# Patient Record
Sex: MALE | Race: WHITE | NOT HISPANIC OR LATINO | Employment: OTHER | URBAN - NONMETROPOLITAN AREA
[De-identification: names, ages, dates, MRNs, and addresses within clinical notes are randomized per-mention and may not be internally consistent; named-entity substitution may affect disease eponyms.]

---

## 2017-01-01 ENCOUNTER — RESULTS ENCOUNTER (OUTPATIENT)
Dept: UROLOGY | Facility: CLINIC | Age: 67
End: 2017-01-01

## 2017-01-01 ENCOUNTER — OFFICE VISIT (OUTPATIENT)
Dept: UROLOGY | Facility: CLINIC | Age: 67
End: 2017-01-01

## 2017-01-01 VITALS
TEMPERATURE: 97.4 F | WEIGHT: 165 LBS | HEIGHT: 66 IN | SYSTOLIC BLOOD PRESSURE: 122 MMHG | DIASTOLIC BLOOD PRESSURE: 68 MMHG | BODY MASS INDEX: 26.52 KG/M2

## 2017-01-01 DIAGNOSIS — C61 PROSTATE CANCER (HCC): Primary | ICD-10-CM

## 2017-01-01 DIAGNOSIS — R33.9 RETENTION, URINE: ICD-10-CM

## 2017-01-01 DIAGNOSIS — C61 PROSTATE CANCER (HCC): ICD-10-CM

## 2017-01-01 PROCEDURE — 99214 OFFICE O/P EST MOD 30 MIN: CPT | Performed by: UROLOGY

## 2017-01-01 RX ORDER — AMOXICILLIN AND CLAVULANATE POTASSIUM 500; 125 MG/1; MG/1
1 TABLET, FILM COATED ORAL 2 TIMES DAILY
COMMUNITY
End: 2018-01-01

## 2017-01-12 ENCOUNTER — TELEPHONE (OUTPATIENT)
Dept: UROLOGY | Facility: CLINIC | Age: 67
End: 2017-01-12

## 2017-01-12 DIAGNOSIS — C61 PROSTATE CANCER (HCC): Primary | ICD-10-CM

## 2017-01-12 DIAGNOSIS — Z12.5 ENCOUNTER FOR SCREENING FOR MALIGNANT NEOPLASM OF PROSTATE: ICD-10-CM

## 2017-01-12 NOTE — TELEPHONE ENCOUNTER
I have talked to dr bae and he would like to see sarbjit with a psa before setting up the imaging.       ----- Message from Keren Lopez MA sent at 1/11/2017 11:21 AM CST -----  CHAVEZ DURAN FROM [facility] CALLED REGARDING A PROCEDURE DR. BAE WAS WANTING TO PERFORM. THE PATIENT AGREED HOWEVER CHAVEZ STATES HE MUST BE SEDATED AND WANTS TO SPEAK WITH DR. BAE  COULD YOU PLEASE ADVISE    [phone]  OR CELL [phone]

## 2017-01-17 ENCOUNTER — RESULTS ENCOUNTER (OUTPATIENT)
Dept: UROLOGY | Facility: CLINIC | Age: 67
End: 2017-01-17

## 2017-01-17 DIAGNOSIS — Z12.5 ENCOUNTER FOR SCREENING FOR MALIGNANT NEOPLASM OF PROSTATE: ICD-10-CM

## 2017-01-17 DIAGNOSIS — C61 PROSTATE CANCER (HCC): ICD-10-CM

## 2017-01-23 ENCOUNTER — TELEPHONE (OUTPATIENT)
Dept: UROLOGY | Facility: CLINIC | Age: 67
End: 2017-01-23

## 2017-01-23 NOTE — TELEPHONE ENCOUNTER
DONE      ----- Message from Karena Sainz sent at 1/23/2017  1:09 PM CST -----  Contact: [facility]  They said they need an order faxed today to draw his PSA for his apt on Thurs. The fax is [phone], the phone if you need to call is [phone].

## 2017-01-26 ENCOUNTER — OFFICE VISIT (OUTPATIENT)
Dept: UROLOGY | Facility: CLINIC | Age: 67
End: 2017-01-26

## 2017-01-26 VITALS
SYSTOLIC BLOOD PRESSURE: 124 MMHG | DIASTOLIC BLOOD PRESSURE: 68 MMHG | HEIGHT: 67 IN | BODY MASS INDEX: 25.43 KG/M2 | TEMPERATURE: 96.3 F | WEIGHT: 162 LBS

## 2017-01-26 DIAGNOSIS — C61 PROSTATE CANCER (HCC): Primary | ICD-10-CM

## 2017-01-26 DIAGNOSIS — N30.01 ACUTE CYSTITIS WITH HEMATURIA: ICD-10-CM

## 2017-01-26 DIAGNOSIS — R33.9 RETENTION, URINE: ICD-10-CM

## 2017-01-26 PROCEDURE — 99214 OFFICE O/P EST MOD 30 MIN: CPT | Performed by: UROLOGY

## 2017-01-26 RX ORDER — AMOXICILLIN AND CLAVULANATE POTASSIUM 875; 125 MG/1; MG/1
1 TABLET, FILM COATED ORAL 2 TIMES DAILY
COMMUNITY
End: 2017-01-26

## 2017-01-26 RX ORDER — SULFAMETHOXAZOLE AND TRIMETHOPRIM 800; 160 MG/1; MG/1
1 TABLET ORAL 2 TIMES DAILY
Qty: 14 TABLET | Refills: 0 | Status: SHIPPED | OUTPATIENT
Start: 2017-01-26 | End: 2017-02-02

## 2017-01-26 NOTE — PROGRESS NOTES
Subjective    Mr. Cano is 66 y.o. male    Chief Complaint: Prostate Cancer    History of Present Illness     Prostate Cancer  Pt. presents with history of prostate cancer diagnosed in April 2013. Current disease state ishigh risk disease Pt. underwent watchful waiting Path showing T1c 8 adenocarcinoma of prostate with N/A surgical margins. Patient has not received XRT completed in April 2013 .He is on nothing for management of prostate cancer. Pt. having zero ppd incontinence. Associated voiding symptoms include No evidence of voiding symptoms . Pt. Is having erectile dysfunction thatNA to PDE5 inhibitors. There has been no bone pain, weight loss, abdominal pain, back pain, or gross hematuria. Most recent PSA 45.38.     Urinary Retention  Patient complains of urinary retention. Onset of retention was 6 months ago and was gradual in onset. Patient currently does have a urinary catheter in place. none ml of urine were drained when catheter was placed. Prior to this event voiding symptoms consisted of none. Prior treatments include SPT in place. Recent medications that may have affected his voiding include none.     UTI  Patient is here for recurrent UTI.  The infections have been occurring for 1week.  Context of the infections initial.  Type of UTI is Acute cystitis Patient has had 1 infections in the last year.  Of the diagnosed infections, 1 have been culture proven.  Onset has been gradual. Course of the symptoms has been unchanged .  Associated symptoms include hematuria.  The patient has tried  antibiotics  somewhat effective for management.   Previous  urologic procedures SPT.  Previous workup includes urine culture.      The following portions of the patient's history were reviewed and updated as appropriate: allergies, current medications, past family history, past medical history, past social history, past surgical history and problem list.    Review of Systems   Constitutional: Negative for chills and fever.  "  Gastrointestinal: Negative for abdominal pain, anal bleeding and blood in stool.   Genitourinary: Negative for flank pain and hematuria.         Current Outpatient Prescriptions:   •  amantadine (SYMMETREL) 100 MG capsule, , Disp: , Rfl:   •  levothyroxine (SYNTHROID, LEVOTHROID) 112 MCG tablet, , Disp: , Rfl:   •  PARoxetine (PAXIL) 20 MG tablet, , Disp: , Rfl:   •  risperiDONE (RisperDAL) 0.5 MG tablet, , Disp: , Rfl:   •  tamsulosin (FLOMAX) 0.4 MG capsule 24 hr capsule, , Disp: , Rfl:   •  warfarin (COUMADIN) 2.5 MG tablet, 2 mg., Disp: , Rfl:   •  sulfamethoxazole-trimethoprim (BACTRIM DS) 800-160 MG per tablet, Take 1 tablet by mouth 2 (Two) Times a Day for 7 days., Disp: 14 tablet, Rfl: 0    Past Medical History   Diagnosis Date   • Anemia    • Blood clotting disorder    • Cancer      prostate   • Depression    • Mentally challenged    • Thyroid disease        No past surgical history on file.    Social History     Social History   • Marital status: Single     Spouse name: N/A   • Number of children: N/A   • Years of education: N/A     Social History Main Topics   • Smoking status: Never Smoker   • Smokeless tobacco: None   • Alcohol use None   • Drug use: None   • Sexual activity: Not Asked     Other Topics Concern   • None     Social History Narrative       Family History   Problem Relation Age of Onset   • No Known Problems Father    • No Known Problems Mother        Objective    Visit Vitals   • /68   • Temp 96.3 °F (35.7 °C)   • Ht 67\" (170.2 cm)   • Wt 162 lb (73.5 kg)   • BMI 25.37 kg/m2       Physical Exam   Constitutional: He is oriented to person, place, and time. He appears well-developed and well-nourished. No distress.   Pulmonary/Chest: Effort normal.   Abdominal: Soft. He exhibits no distension and no mass. There is no tenderness. There is no rebound and no guarding. No hernia.   Neurological: He is alert and oriented to person, place, and time.   Skin: Skin is warm and dry. He is not " diaphoretic.   Psychiatric: He has a normal mood and affect.   Vitals reviewed.          Results for orders placed or performed during the hospital encounter of 03/29/16   CBC (No diff)   Result Value Ref Range    WBC 7.52 4.80 - 10.80 K/mcL    RBC 4.32 (L) 4.80 - 5.90 M/mcL    Hemoglobin 13.1 (L) 14.0 - 18.0 g/dL    Hematocrit 38.0 (L) 40.0 - 52.0 %    MCV 88.0 82.0 - 95.0 fL    MCH 30.3 28.0 - 32.0 pg    MCHC 34.5 33.0 - 36.0 gm/dL    RDW-SD 44.8 40.0 - 54.0 fL    RDW-CV 13.8 12.0 - 15.0 %    RDW 13.8 12 - 15 %    Platelets 262 130 - 400 K/mcL   APTT   Result Value Ref Range    PTT 40.1 (H) 24.1 - 34.8 Seconds   Protime-INR   Result Value Ref Range    Protime 16.4 (H) 11.9 - 14.6 Seconds    INR 1.30 (H) 0.91 - 1.09   Basic metabolic panel   Result Value Ref Range    Sodium 143 135 - 145 mmol/L    Potassium 3.5 3.5 - 5.3 mmol/L    Chloride 96 (L) 98 - 110 mmol/L    CO2 36 (H) 24 - 31 mmol/L    Glucose 101 (H) 70 - 100 mg/dL    BUN 9 5 - 21 mg/dL    Creatinine 0.89 0.5 - 1.4 mg/dL    Calcium 9.2 8.4 - 10.4 mg/dL    Anion Gap 12 4 - 13 mmol/L    Est GFR by Clearance 85 ml/min/1.732   Urinalysis   Result Value Ref Range    Color Yellow     Appearance, UA Cloudy (A) Clear    pH, UA 5.5 5.0 - 8.0    Specific Gravity, UA 1.017 1.005 - 1.030    Glucose, UA Negative Negative mg/dL    Ketones, UA Negative Negative mg/dL    Bilirubin, UA Negative Negative    Blood, UA Small (A) Negative    Protein, UA Trace (A) Negative mg/dL    Nitrite, UA Negative Negative    Leukocytes, UA Large (A) Negative    Urobilinogen, UA 0.2 0.2,1.0 E.U./dL   Urinalysis, Microscopic only   Result Value Ref Range    WBC, UA Too numerous to count (A) None Seen /hpf    RBC, UA Too numerous to count (A) None Seen /hpf    Epithelial Cells, UA None Seen None Seen /hpf    Bacteria, UA 2+ (A) None Seen /hpf    Casts 3-6 /lpf   Type and screen   Result Value Ref Range    ABORH O Rh Negative     Antibody Screen Negative     HBB Previous History Records  Reviewed      Assessment and Plan    Tucker was seen today for prostate cancer.    Diagnoses and all orders for this visit:    Prostate cancer    Retention, urine    Acute cystitis with hematuria  -     sulfamethoxazole-trimethoprim (BACTRIM DS) 800-160 MG per tablet; Take 1 tablet by mouth 2 (Two) Times a Day for 7 days.    I had a long discussion with the patient's caregiver.  He has had rapid worsening of his PSA.  To me this indicates he has metastatic disease.    I reviewed 10 pages of outside medical records summarizes follows: He had a PSA of 45.38.  It was previously 26.9 in October.  He had a urine culture positive for Klebsiella.  This was pansensitive.  I am going to switch to Bactrim for UTI.     Patient is severely  Mentally challenged and can not tolerate imaging.  His PSA has nearly doubled in 3 months.  I am going to presume he has metastatic disease.  I am going to give him Firmagon for 3 months then switch him over to Lupron every 6 months.

## 2017-01-26 NOTE — LETTER
January 26, 2017     Franklin Lara MD  [address] [facility] [address]    Patient: Tucker Cano   YOB: 1950   Date of Visit: 1/26/2017       Dear Dr. Marco MD:    Thank you for referring Tucker Cano to me for evaluation. Below are the relevant portions of my assessment and plan of care.    If you have questions, please do not hesitate to call me. I look forward to following Tucker along with you.         Sincerely,        Sohail Schmitz MD        CC: No Recipients  Sohail Schmitz MD  1/26/2017  8:48 AM  Signed  Subjective    Mr. Cano is 66 y.o. male    Chief Complaint: Prostate Cancer    History of Present Illness     Prostate Cancer  Pt. presents with history of prostate cancer diagnosed in April 2013. Current disease state ishigh risk disease Pt. underwent watchful waiting Path showing T1c 8 adenocarcinoma of prostate with N/A surgical margins. Patient has not received XRT completed in April 2013 .He is on nothing for management of prostate cancer. Pt. having zero ppd incontinence. Associated voiding symptoms include No evidence of voiding symptoms . Pt. Is having erectile dysfunction thatNA to PDE5 inhibitors. There has been no bone pain, weight loss, abdominal pain, back pain, or gross hematuria. Most recent PSA 45.38.     Urinary Retention  Patient complains of urinary retention. Onset of retention was 6 months ago and was gradual in onset. Patient currently does have a urinary catheter in place. none ml of urine were drained when catheter was placed. Prior to this event voiding symptoms consisted of none. Prior treatments include SPT in place. Recent medications that may have affected his voiding include none.     UTI  Patient is here for recurrent UTI.  The infections have been occurring for 1week.  Context of the infections initial.  Type of UTI is Acute cystitis Patient has had 1 infections in the last year.  Of the diagnosed infections, 1 have been culture proven.  Onset  has been gradual. Course of the symptoms has been unchanged .  Associated symptoms include hematuria.  The patient has tried  antibiotics  somewhat effective for management.   Previous  urologic procedures SPT.  Previous workup includes urine culture.      The following portions of the patient's history were reviewed and updated as appropriate: allergies, current medications, past family history, past medical history, past social history, past surgical history and problem list.    Review of Systems   Constitutional: Negative for chills and fever.   Gastrointestinal: Negative for abdominal pain, anal bleeding and blood in stool.   Genitourinary: Negative for flank pain and hematuria.         Current Outpatient Prescriptions:   •  amantadine (SYMMETREL) 100 MG capsule, , Disp: , Rfl:   •  levothyroxine (SYNTHROID, LEVOTHROID) 112 MCG tablet, , Disp: , Rfl:   •  PARoxetine (PAXIL) 20 MG tablet, , Disp: , Rfl:   •  risperiDONE (RisperDAL) 0.5 MG tablet, , Disp: , Rfl:   •  tamsulosin (FLOMAX) 0.4 MG capsule 24 hr capsule, , Disp: , Rfl:   •  warfarin (COUMADIN) 2.5 MG tablet, 2 mg., Disp: , Rfl:   •  sulfamethoxazole-trimethoprim (BACTRIM DS) 800-160 MG per tablet, Take 1 tablet by mouth 2 (Two) Times a Day for 7 days., Disp: 14 tablet, Rfl: 0    Past Medical History   Diagnosis Date   • Anemia    • Blood clotting disorder    • Cancer      prostate   • Depression    • Mentally challenged    • Thyroid disease        No past surgical history on file.    Social History     Social History   • Marital status: Single     Spouse name: N/A   • Number of children: N/A   • Years of education: N/A     Social History Main Topics   • Smoking status: Never Smoker   • Smokeless tobacco: None   • Alcohol use None   • Drug use: None   • Sexual activity: Not Asked     Other Topics Concern   • None     Social History Narrative       Family History   Problem Relation Age of Onset   • No Known Problems Father    • No Known Problems Mother   "      Objective    Visit Vitals   • /68   • Temp 96.3 °F (35.7 °C)   • Ht 67\" (170.2 cm)   • Wt 162 lb (73.5 kg)   • BMI 25.37 kg/m2       Physical Exam   Constitutional: He is oriented to person, place, and time. He appears well-developed and well-nourished. No distress.   Pulmonary/Chest: Effort normal.   Abdominal: Soft. He exhibits no distension and no mass. There is no tenderness. There is no rebound and no guarding. No hernia.   Neurological: He is alert and oriented to person, place, and time.   Skin: Skin is warm and dry. He is not diaphoretic.   Psychiatric: He has a normal mood and affect.   Vitals reviewed.          Results for orders placed or performed during the hospital encounter of 03/29/16   CBC (No diff)   Result Value Ref Range    WBC 7.52 4.80 - 10.80 K/mcL    RBC 4.32 (L) 4.80 - 5.90 M/mcL    Hemoglobin 13.1 (L) 14.0 - 18.0 g/dL    Hematocrit 38.0 (L) 40.0 - 52.0 %    MCV 88.0 82.0 - 95.0 fL    MCH 30.3 28.0 - 32.0 pg    MCHC 34.5 33.0 - 36.0 gm/dL    RDW-SD 44.8 40.0 - 54.0 fL    RDW-CV 13.8 12.0 - 15.0 %    RDW 13.8 12 - 15 %    Platelets 262 130 - 400 K/mcL   APTT   Result Value Ref Range    PTT 40.1 (H) 24.1 - 34.8 Seconds   Protime-INR   Result Value Ref Range    Protime 16.4 (H) 11.9 - 14.6 Seconds    INR 1.30 (H) 0.91 - 1.09   Basic metabolic panel   Result Value Ref Range    Sodium 143 135 - 145 mmol/L    Potassium 3.5 3.5 - 5.3 mmol/L    Chloride 96 (L) 98 - 110 mmol/L    CO2 36 (H) 24 - 31 mmol/L    Glucose 101 (H) 70 - 100 mg/dL    BUN 9 5 - 21 mg/dL    Creatinine 0.89 0.5 - 1.4 mg/dL    Calcium 9.2 8.4 - 10.4 mg/dL    Anion Gap 12 4 - 13 mmol/L    Est GFR by Clearance 85 ml/min/1.732   Urinalysis   Result Value Ref Range    Color Yellow     Appearance, UA Cloudy (A) Clear    pH, UA 5.5 5.0 - 8.0    Specific Gravity, UA 1.017 1.005 - 1.030    Glucose, UA Negative Negative mg/dL    Ketones, UA Negative Negative mg/dL    Bilirubin, UA Negative Negative    Blood, UA Small (A) " Negative    Protein, UA Trace (A) Negative mg/dL    Nitrite, UA Negative Negative    Leukocytes, UA Large (A) Negative    Urobilinogen, UA 0.2 0.2,1.0 E.U./dL   Urinalysis, Microscopic only   Result Value Ref Range    WBC, UA Too numerous to count (A) None Seen /hpf    RBC, UA Too numerous to count (A) None Seen /hpf    Epithelial Cells, UA None Seen None Seen /hpf    Bacteria, UA 2+ (A) None Seen /hpf    Casts 3-6 /lpf   Type and screen   Result Value Ref Range    ABORH O Rh Negative     Antibody Screen Negative     HBB Previous History Records Reviewed      Assessment and Plan    Tucker was seen today for prostate cancer.    Diagnoses and all orders for this visit:    Prostate cancer    Retention, urine    Acute cystitis with hematuria  -     sulfamethoxazole-trimethoprim (BACTRIM DS) 800-160 MG per tablet; Take 1 tablet by mouth 2 (Two) Times a Day for 7 days.    I had a long discussion with the patient's caregiver.  He has had rapid worsening of his PSA.  To me this indicates he has metastatic disease.    I reviewed 10 pages of outside medical records summarizes follows: He had a PSA of 45.38.  It was previously 26.9 in October.  He had a urine culture positive for Klebsiella.  This was pansensitive.  I am going to switch to Bactrim for UTI.     Patient is severely  Mentally challenged and can not tolerate imaging.  His PSA has nearly doubled in 3 months.  I am going to presume he has metastatic disease.  I am going to give him Firmagon for 3 months then switch him over to Lupron every 6 months.

## 2017-01-26 NOTE — MR AVS SNAPSHOT
Tucker MELISSA Cano   1/26/2017 8:10 AM   Office Visit    Dept Phone:  [phone]   Encounter #:  [id number]    Provider:  Sohail Schmitz MD   Department:  [facility] UROLOGY                Your Full Care Plan              Today's Medication Changes          These changes are accurate as of: 1/26/17  8:31 AM.  If you have any questions, ask your nurse or doctor.               New Medication(s)Ordered:     sulfamethoxazole-trimethoprim 800-160 MG per tablet   Commonly known as:  BACTRIM DS   Take 1 tablet by mouth 2 (Two) Times a Day for 7 days.   Started by:  Sohail Schmitz MD         Medication(s)that have changed:     warfarin 2.5 MG tablet   Commonly known as:  COUMADIN   2 mg.   What changed:  Another medication with the same name was removed. Continue taking this medication, and follow the directions you see here.   Changed by:  Sohail Schmitz MD         Stop taking medication(s)listed here:     amoxicillin-clavulanate 500-125 MG per tablet   Commonly known as:  AUGMENTIN   Stopped by:  Sohail Schmitz MD           amoxicillin-clavulanate 875-125 MG per tablet   Commonly known as:  AUGMENTIN   Stopped by:  Sohail Schmitz MD                Where to Get Your Medications      These medications were sent to [place], [place] - [address] [facility] [phone]  - [phone]   [address] [facility] [address]     Phone:  [phone]     sulfamethoxazole-trimethoprim 800-160 MG per tablet                  Your Updated Medication List          This list is accurate as of: 1/26/17  8:31 AM.  Always use your most recent med list.                amantadine 100 MG capsule   Commonly known as:  SYMMETREL       levothyroxine 112 MCG tablet   Commonly known as:  SYNTHROID, LEVOTHROID       PARoxetine 20 MG tablet   Commonly known as:  PAXIL       risperiDONE 0.5 MG tablet   Commonly known as:  risperDAL       sulfamethoxazole-trimethoprim 800-160 MG per tablet   Commonly known as:  BACTRIM DS   Take 1 tablet by mouth 2 (Two) Times a Day for 7 days.       tamsulosin 0.4 MG capsule 24 hr capsule   Commonly known as:  FLOMAX       warfarin 2.5 MG tablet   Commonly known as:  COUMADIN               You Were Diagnosed With        Codes Comments    Prostate cancer    -  Primary ICD-10-CM: C61  ICD-9-CM: 185     Retention, urine     ICD-10-CM: R33.9  ICD-9-CM: 788.20     Acute cystitis with hematuria     ICD-10-CM: N30.01  ICD-9-CM: 595.0       Instructions     None    Patient Instructions History      Upcoming Appointments     Visit Type Date Time Department    FOLLOW UP 2017  8:10 AM [facility] UROLOGY [facility]AD    IN OFFICE PROCEDURE 2017  9:00 AM [facility] UROLOGY [facility]      MyChart Signup     [facility] [employer] allows you to send messages to your doctor, view your test results, renew your prescriptions, schedule appointments, and more. To sign up, go to [employer] and click on the Sign Up Now link in the New User? box. Enter your [employer] Activation Code exactly as it appears below along with the last four digits of your Social Security Number and your Date of Birth () to complete the sign-up process. If you do not sign up before the expiration date, you must request a new code.    [employer] Activation Code: [email or web address]  Expires: 2017  8:29 AM    If you have questions, you can email [email or web address] or call [phone] to talk to our [employer] staff. Remember, [employer] is NOT to be used for urgent needs. For medical emergencies, dial 911.               Other Info from Your Visit           Your Appointments     2017  9:00 AM CDT   IN OFFICE PROCEDURE with [facility] UROLOGY NURSE   [facility] UROLOG[facility] (--)    [address] [place] [address] [phone]           Bring medication list, test results, and radiology films that apply.              Allergies     Asa  "[Aspirin]      Cephalosporins        Reason for Visit     Prostate Cancer           Vital Signs     Blood Pressure Temperature Height Weight Body Mass Index Smoking Status    124/68 96.3 °F (35.7 °C) 67\" (170.2 cm) 162 lb (73.5 kg) 25.37 kg/m2 Never Smoker      Problems and Diagnoses Noted     Prostate cancer    -  Primary    Retention, urine        Infection or inflammation of bladder            "

## 2017-02-01 PROBLEM — C61 PROSTATE CANCER (HCC): Status: ACTIVE | Noted: 2017-02-01

## 2017-02-02 ENCOUNTER — PROCEDURE VISIT (OUTPATIENT)
Dept: UROLOGY | Facility: CLINIC | Age: 67
End: 2017-02-02

## 2017-02-02 DIAGNOSIS — C61 PROSTATE CANCER (HCC): Primary | ICD-10-CM

## 2017-02-02 PROCEDURE — 96402 CHEMO HORMON ANTINEOPL SQ/IM: CPT | Performed by: UROLOGY

## 2017-02-02 NOTE — PROGRESS NOTES
Patient of Dr. Nadir NEVES states he is here today for a 1 month firmagon injection. Patient denies any fever, chills, N&V or hematuria. I administered an 240mg Firmagon injection in the LUQ & RUQ with no complications. Dr. Johnson was here in the office at the time of injection. The patient was advised to schedule next injection in 1 month. Patient verbalized understanding.

## 2017-03-02 ENCOUNTER — PROCEDURE VISIT (OUTPATIENT)
Dept: UROLOGY | Facility: CLINIC | Age: 67
End: 2017-03-02

## 2017-03-02 DIAGNOSIS — C61 PROSTATE CANCER (HCC): Primary | ICD-10-CM

## 2017-03-02 PROCEDURE — 96402 CHEMO HORMON ANTINEOPL SQ/IM: CPT | Performed by: UROLOGY

## 2017-03-02 NOTE — PROGRESS NOTES
Patient of Dr. Schmitz states he is here today for a 1 month firmagon injection. Patient denies any fever, chills, N&V or hematuria. I administered an 80mg Firmagon injection in the LUQ with no complications. Dr. Johnson was here in the office at the time of injection. The patient was advised to schedule next injection in 1 month. Patient verbalized understanding.         Schedule next Firmagon after 3-30-17

## 2017-04-03 ENCOUNTER — PROCEDURE VISIT (OUTPATIENT)
Dept: UROLOGY | Facility: CLINIC | Age: 67
End: 2017-04-03

## 2017-04-03 DIAGNOSIS — C61 PROSTATE CANCER (HCC): Primary | ICD-10-CM

## 2017-04-03 PROCEDURE — 96402 CHEMO HORMON ANTINEOPL SQ/IM: CPT | Performed by: UROLOGY

## 2017-04-03 NOTE — PATIENT INSTRUCTIONS
Schedule next injection after May 1, 2017 at which time the patient will start 6 month/ 45 mg Lupron injections.

## 2017-04-03 NOTE — PROGRESS NOTES
Patient of Dr. Schmitz states he is here today for a 1 month firmagon injection. Patient denies any fever, chills, N&V or hematuria. I administered an 80mg Firmagon injection in the RUQ  with no complications. Dr. Schmitz was here in the office at the time of injection. The patient was advised to schedule next injection in 1 month. Patient verbalized understanding.       Schedule next injection after May 1, 2017 at which time the patient will start 6 month/ 45 mg Lupron injections.

## 2017-04-17 ENCOUNTER — RESULTS ENCOUNTER (OUTPATIENT)
Dept: UROLOGY | Facility: CLINIC | Age: 67
End: 2017-04-17

## 2017-04-17 DIAGNOSIS — C61 CANCER OF PROSTATE (HCC): ICD-10-CM

## 2017-04-26 ENCOUNTER — RESULTS ENCOUNTER (OUTPATIENT)
Dept: UROLOGY | Facility: CLINIC | Age: 67
End: 2017-04-26

## 2017-04-26 DIAGNOSIS — C61 PROSTATE CANCER (HCC): ICD-10-CM

## 2017-05-01 ENCOUNTER — OFFICE VISIT (OUTPATIENT)
Dept: UROLOGY | Facility: CLINIC | Age: 67
End: 2017-05-01

## 2017-05-01 VITALS
DIASTOLIC BLOOD PRESSURE: 90 MMHG | HEIGHT: 67 IN | TEMPERATURE: 97.3 F | SYSTOLIC BLOOD PRESSURE: 150 MMHG | WEIGHT: 167 LBS | BODY MASS INDEX: 26.21 KG/M2

## 2017-05-01 DIAGNOSIS — R33.9 RETENTION, URINE: ICD-10-CM

## 2017-05-01 DIAGNOSIS — C61 PROSTATE CANCER (HCC): Primary | ICD-10-CM

## 2017-05-01 PROCEDURE — 99213 OFFICE O/P EST LOW 20 MIN: CPT | Performed by: UROLOGY

## 2017-05-02 ENCOUNTER — PROCEDURE VISIT (OUTPATIENT)
Dept: UROLOGY | Facility: CLINIC | Age: 67
End: 2017-05-02

## 2017-05-02 DIAGNOSIS — C61 PROSTATE CANCER (HCC): Primary | ICD-10-CM

## 2017-05-02 PROCEDURE — 96402 CHEMO HORMON ANTINEOPL SQ/IM: CPT | Performed by: UROLOGY

## 2017-07-19 ENCOUNTER — TELEPHONE (OUTPATIENT)
Dept: UROLOGY | Facility: CLINIC | Age: 67
End: 2017-07-19

## 2017-07-30 ENCOUNTER — RESULTS ENCOUNTER (OUTPATIENT)
Dept: UROLOGY | Facility: CLINIC | Age: 67
End: 2017-07-30

## 2017-07-30 DIAGNOSIS — C61 PROSTATE CANCER (HCC): ICD-10-CM

## 2017-08-01 ENCOUNTER — RESULTS ENCOUNTER (OUTPATIENT)
Dept: UROLOGY | Facility: CLINIC | Age: 67
End: 2017-08-01

## 2017-08-01 DIAGNOSIS — C61 PROSTATE CANCER (HCC): ICD-10-CM

## 2017-08-02 ENCOUNTER — OFFICE VISIT (OUTPATIENT)
Dept: UROLOGY | Facility: CLINIC | Age: 67
End: 2017-08-02

## 2017-08-02 VITALS
HEIGHT: 67 IN | DIASTOLIC BLOOD PRESSURE: 88 MMHG | BODY MASS INDEX: 26.06 KG/M2 | WEIGHT: 166 LBS | TEMPERATURE: 96.7 F | SYSTOLIC BLOOD PRESSURE: 134 MMHG

## 2017-08-02 DIAGNOSIS — C61 PROSTATE CANCER (HCC): Primary | ICD-10-CM

## 2017-08-02 DIAGNOSIS — R33.9 RETENTION, URINE: ICD-10-CM

## 2017-08-02 PROCEDURE — 99213 OFFICE O/P EST LOW 20 MIN: CPT | Performed by: UROLOGY

## 2017-08-02 NOTE — PROGRESS NOTES
Subjective    Mr. Cano is 67 y.o. male    Chief Complaint: Prostate Cancer    History of Present Illness       Prostate Cancer  Pt. presents with history of prostate cancer diagnosed in April 2013. Current disease state ishigh risk disease Pt. underwent watchful waiting Path showing T1c 8 adenocarcinoma of prostate with N/A surgical margins. Patient has not received XRT completed in April 2013 .He is on Lupron for management of prostate cancer, ADT started 1/17. Pt. having zero ppd incontinence. Associated voiding symptoms include No evidence of voiding symptoms . Pt. Is having erectile dysfunction thatNA to PDE5 inhibitors. There has been no bone pain, weight loss, abdominal pain, back pain, or gross hematuria. Most recent PSA 4.02 (8.14 ,45.18).      Urinary Retention  Patient complains of urinary retention. Onset of retention was 6 months ago and was gradual in onset. Patient currently does have a urinary catheter in place. none ml of urine were drained when catheter was placed. Prior to this event voiding symptoms consisted of none. Prior treatments include SPT in place. Recent medications that may have affected his voiding include none.     The following portions of the patient's history were reviewed and updated as appropriate: allergies, current medications, past family history, past medical history, past social history, past surgical history and problem list.    Review of Systems   Constitutional: Negative for chills and fever.   Gastrointestinal: Negative for abdominal pain, anal bleeding and blood in stool.   Genitourinary: Negative for flank pain and hematuria.         Current Outpatient Prescriptions:   •  amantadine (SYMMETREL) 100 MG capsule, Take 100 mg by mouth Daily., Disp: , Rfl:   •  levothyroxine (SYNTHROID, LEVOTHROID) 112 MCG tablet, Take 112 mcg by mouth Daily., Disp: , Rfl:   •  PARoxetine (PAXIL) 20 MG tablet, Take 20 mg by mouth Every Morning., Disp: , Rfl:   •  risperiDONE (RisperDAL) 0.5  "MG tablet, Take 0.5 mg by mouth Daily., Disp: , Rfl:   •  tamsulosin (FLOMAX) 0.4 MG capsule 24 hr capsule, 1 capsule Daily., Disp: , Rfl:   •  warfarin (COUMADIN) 2.5 MG tablet, 2 mg Daily. As directed, Disp: , Rfl:     Past Medical History:   Diagnosis Date   • Anemia    • Blood clotting disorder    • Cancer     prostate   • Depression    • Mentally challenged    • Thyroid disease        No past surgical history on file.    Social History     Social History   • Marital status: Single     Spouse name: N/A   • Number of children: N/A   • Years of education: N/A     Social History Main Topics   • Smoking status: Never Smoker   • Smokeless tobacco: None   • Alcohol use No   • Drug use: None   • Sexual activity: Not Asked     Other Topics Concern   • None     Social History Narrative       Family History   Problem Relation Age of Onset   • No Known Problems Father    • No Known Problems Mother        Objective    /88  Temp 96.7 °F (35.9 °C)  Ht 67\" (170.2 cm)  Wt 166 lb (75.3 kg)  BMI 26 kg/m2    Physical Exam   Constitutional: He is oriented to person, place, and time. He appears well-developed and well-nourished. No distress.   Pulmonary/Chest: Effort normal.   Abdominal: Soft. He exhibits no distension and no mass. There is no tenderness. There is no rebound and no guarding. No hernia.   Neurological: He is alert and oriented to person, place, and time.   Skin: Skin is warm and dry. He is not diaphoretic.   Psychiatric: He has a normal mood and affect.   Vitals reviewed.          Results for orders placed or performed during the hospital encounter of 03/29/16   CBC (No diff)   Result Value Ref Range    WBC 7.52 4.80 - 10.80 K/mcL    RBC 4.32 (L) 4.80 - 5.90 M/mcL    Hemoglobin 13.1 (L) 14.0 - 18.0 g/dL    Hematocrit 38.0 (L) 40.0 - 52.0 %    MCV 88.0 82.0 - 95.0 fL    MCH 30.3 28.0 - 32.0 pg    MCHC 34.5 33.0 - 36.0 gm/dL    RDW-SD 44.8 40.0 - 54.0 fL    RDW-CV 13.8 12.0 - 15.0 %    RDW 13.8 12 - 15 %    " Platelets 262 130 - 400 K/mcL   APTT   Result Value Ref Range    PTT 40.1 (H) 24.1 - 34.8 Seconds   Protime-INR   Result Value Ref Range    Protime 16.4 (H) 11.9 - 14.6 Seconds    INR 1.30 (H) 0.91 - 1.09   Basic metabolic panel   Result Value Ref Range    Sodium 143 135 - 145 mmol/L    Potassium 3.5 3.5 - 5.3 mmol/L    Chloride 96 (L) 98 - 110 mmol/L    CO2 36 (H) 24 - 31 mmol/L    Glucose 101 (H) 70 - 100 mg/dL    BUN 9 5 - 21 mg/dL    Creatinine 0.89 0.5 - 1.4 mg/dL    Calcium 9.2 8.4 - 10.4 mg/dL    Anion Gap 12 4 - 13 mmol/L    Est GFR by Clearance 85 ml/min/1.732   Urinalysis   Result Value Ref Range    Color Yellow     Appearance, UA Cloudy (A) Clear    pH, UA 5.5 5.0 - 8.0    Specific Gravity, UA 1.017 1.005 - 1.030    Glucose, UA Negative Negative mg/dL    Ketones, UA Negative Negative mg/dL    Bilirubin, UA Negative Negative    Blood, UA Small (A) Negative    Protein, UA Trace (A) Negative mg/dL    Nitrite, UA Negative Negative    Leukocytes, UA Large (A) Negative    Urobilinogen, UA 0.2 0.2,1.0 E.U./dL   Urinalysis, Microscopic only   Result Value Ref Range    WBC, UA Too numerous to count (A) None Seen /hpf    RBC, UA Too numerous to count (A) None Seen /hpf    Epithelial Cells, UA None Seen None Seen /hpf    Bacteria, UA 2+ (A) None Seen /hpf    Casts 3-6 /lpf   Type and screen   Result Value Ref Range    ABORH O Rh Negative     Antibody Screen Negative     HBB Previous History Records Reviewed      Assessment and Plan    Diagnoses and all orders for this visit:    Prostate cancer  -     PSA; Future  -     Testosterone; Future    Retention, urine          His PSA is decreased from 8 to 4 upon institution of ADT it was 45. Cont. Lupron.  Flush SPT daily.

## 2017-10-23 ENCOUNTER — PROCEDURE VISIT (OUTPATIENT)
Dept: UROLOGY | Facility: CLINIC | Age: 67
End: 2017-10-23

## 2017-10-23 DIAGNOSIS — C61 PROSTATE CANCER (HCC): Primary | ICD-10-CM

## 2017-10-23 NOTE — PROGRESS NOTES
Patient of Dr. Schmitz states he is here for his 6 month Lupron injection. Patient denies any fever, chills, N&V or hematuria. I administered 45 mg/6 month Lupron injection IM left hip with no complications. Dr. Schmitz was in the office today at the time of injection. The patient was advised to follow up in 6 months for his next Lupron injection. Patient verbalized understanding.

## 2017-12-04 NOTE — PROGRESS NOTES
Subjective    Mr. Cano is 67 y.o. male    Chief Complaint: Prostate Cancer    History of Present Illness     Prostate Cancer  Pt. presents with history of prostate cancer diagnosed in April 2013. Current disease state ishigh risk disease Pt. underwent watchful waiting Path showing T1c 8 adenocarcinoma of prostate with N/A surgical margins. Patient has not received XRT completed in April 2013 .He is on Lupron for management of prostate cancer, ADT started 1/17. Pt. having zero ppd incontinence. Associated voiding symptoms include No evidence of voiding symptoms . Pt. Is having erectile dysfunction thatNA to PDE5 inhibitors. There has been no bone pain, weight loss, abdominal pain, back pain, or gross hematuria. Most recent PSA 3.76(4.02, 8.14 ,45.18).      Urinary Retention  Patient complains of urinary retention. Onset of retention was 6 months ago and was gradual in onset. Patient currently does have a urinary catheter in place. none ml of urine were drained when catheter was placed. Prior to this event voiding symptoms consisted of none. Prior treatments include SPT in place. Recent medications that may have affected his voiding include none.    The following portions of the patient's history were reviewed and updated as appropriate: allergies, current medications, past family history, past medical history, past social history, past surgical history and problem list.    Review of Systems   Constitutional: Negative for chills and fever.   Gastrointestinal: Negative for abdominal pain, anal bleeding and blood in stool.   Genitourinary: Negative for flank pain, frequency, hematuria and urgency.         Current Outpatient Prescriptions:   •  amantadine (SYMMETREL) 100 MG capsule, Take 100 mg by mouth Daily., Disp: , Rfl:   •  amoxicillin-clavulanate (AUGMENTIN) 500-125 MG per tablet, Take 1 tablet by mouth 2 (Two) Times a Day., Disp: , Rfl:   •  levothyroxine (SYNTHROID, LEVOTHROID) 112 MCG tablet, Take 112 mcg by  "mouth Daily., Disp: , Rfl:   •  PARoxetine (PAXIL) 20 MG tablet, Take 20 mg by mouth Every Morning., Disp: , Rfl:   •  risperiDONE (RisperDAL) 0.5 MG tablet, Take 0.5 mg by mouth Daily., Disp: , Rfl:   •  tamsulosin (FLOMAX) 0.4 MG capsule 24 hr capsule, 1 capsule Daily., Disp: , Rfl:   •  warfarin (COUMADIN) 2.5 MG tablet, 2 mg Daily. As directed, Disp: , Rfl:     Past Medical History:   Diagnosis Date   • Anemia    • Blood clotting disorder    • Cancer     prostate   • Depression    • Mentally challenged    • Thyroid disease        History reviewed. No pertinent surgical history.    Social History     Social History   • Marital status: Single     Spouse name: N/A   • Number of children: N/A   • Years of education: N/A     Social History Main Topics   • Smoking status: Never Smoker   • Smokeless tobacco: Never Used   • Alcohol use No   • Drug use: None   • Sexual activity: Not Asked     Other Topics Concern   • None     Social History Narrative       Family History   Problem Relation Age of Onset   • No Known Problems Father    • No Known Problems Mother        Objective    /68  Temp 97.4 °F (36.3 °C)  Ht 66\" (167.6 cm)  Wt 165 lb (74.8 kg)  BMI 26.63 kg/m2    Physical Exam   Constitutional: He is oriented to person, place, and time. He appears well-developed and well-nourished. No distress.   Pulmonary/Chest: Effort normal.   Abdominal: Soft. He exhibits no distension and no mass. There is no tenderness. There is no rebound and no guarding. No hernia.   Neurological: He is alert and oriented to person, place, and time.   Skin: Skin is warm and dry. He is not diaphoretic.   Psychiatric: He has a normal mood and affect.   Vitals reviewed.          Results for orders placed or performed during the hospital encounter of 03/29/16   CBC (No diff)   Result Value Ref Range    WBC 7.52 4.80 - 10.80 K/mcL    RBC 4.32 (L) 4.80 - 5.90 M/mcL    Hemoglobin 13.1 (L) 14.0 - 18.0 g/dL    Hematocrit 38.0 (L) 40.0 - 52.0 % "    MCV 88.0 82.0 - 95.0 fL    MCH 30.3 28.0 - 32.0 pg    MCHC 34.5 33.0 - 36.0 gm/dL    RDW-SD 44.8 40.0 - 54.0 fL    RDW-CV 13.8 12.0 - 15.0 %    RDW 13.8 12 - 15 %    Platelets 262 130 - 400 K/mcL   APTT   Result Value Ref Range    PTT 40.1 (H) 24.1 - 34.8 Seconds   Protime-INR   Result Value Ref Range    Protime 16.4 (H) 11.9 - 14.6 Seconds    INR 1.30 (H) 0.91 - 1.09   Basic metabolic panel   Result Value Ref Range    Sodium 143 135 - 145 mmol/L    Potassium 3.5 3.5 - 5.3 mmol/L    Chloride 96 (L) 98 - 110 mmol/L    CO2 36 (H) 24 - 31 mmol/L    Glucose 101 (H) 70 - 100 mg/dL    BUN 9 5 - 21 mg/dL    Creatinine 0.89 0.5 - 1.4 mg/dL    Calcium 9.2 8.4 - 10.4 mg/dL    Anion Gap 12 4 - 13 mmol/L    Est GFR by Clearance 85 ml/min/1.732   Urinalysis   Result Value Ref Range    Color Yellow     Appearance, UA Cloudy (A) Clear    pH, UA 5.5 5.0 - 8.0    Specific Gravity, UA 1.017 1.005 - 1.030    Glucose, UA Negative Negative mg/dL    Ketones, UA Negative Negative mg/dL    Bilirubin, UA Negative Negative    Blood, UA Small (A) Negative    Protein, UA Trace (A) Negative mg/dL    Nitrite, UA Negative Negative    Leukocytes, UA Large (A) Negative    Urobilinogen, UA 0.2 0.2,1.0 E.U./dL   Urinalysis, Microscopic only   Result Value Ref Range    WBC, UA Too numerous to count (A) None Seen /hpf    RBC, UA Too numerous to count (A) None Seen /hpf    Epithelial Cells, UA None Seen None Seen /hpf    Bacteria, UA 2+ (A) None Seen /hpf    Casts 3-6 /lpf   Type and screen   Result Value Ref Range    ABORH O Rh Negative     Antibody Screen Negative     HBB Previous History Records Reviewed      Assessment and Plan    Tucker was seen today for prostate cancer.    Diagnoses and all orders for this visit:    Prostate cancer  -     PSA; Future  -     Testosterone; Future    Retention, urine    Other orders  -     SCANNED - LABS  -     SCANNED - LABS          I reviewed laboratories from [facility] indicating a PSA of 3.76.   Previously it had been 4.  His testosterone is at castrate levels.  He will follow-up in 4 months with repeat PSA testing.  Again, I reinforced Calcium and Vitamin D supplementation.    DC flomax as patient has SPT.

## 2018-01-01 ENCOUNTER — CONSULT (OUTPATIENT)
Dept: ONCOLOGY | Facility: CLINIC | Age: 68
End: 2018-01-01

## 2018-01-01 ENCOUNTER — OFFICE VISIT (OUTPATIENT)
Dept: UROLOGY | Facility: CLINIC | Age: 68
End: 2018-01-01

## 2018-01-01 ENCOUNTER — PROCEDURE VISIT (OUTPATIENT)
Dept: UROLOGY | Facility: CLINIC | Age: 68
End: 2018-01-01

## 2018-01-01 ENCOUNTER — LAB (OUTPATIENT)
Dept: LAB | Facility: HOSPITAL | Age: 68
End: 2018-01-01

## 2018-01-01 ENCOUNTER — TELEPHONE (OUTPATIENT)
Dept: ONCOLOGY | Facility: CLINIC | Age: 68
End: 2018-01-01

## 2018-01-01 ENCOUNTER — OFFICE VISIT (OUTPATIENT)
Dept: ONCOLOGY | Facility: CLINIC | Age: 68
End: 2018-01-01

## 2018-01-01 ENCOUNTER — TELEPHONE (OUTPATIENT)
Dept: UROLOGY | Facility: CLINIC | Age: 68
End: 2018-01-01

## 2018-01-01 ENCOUNTER — RESULTS ENCOUNTER (OUTPATIENT)
Dept: UROLOGY | Facility: CLINIC | Age: 68
End: 2018-01-01

## 2018-01-01 VITALS
HEIGHT: 68 IN | BODY MASS INDEX: 26.72 KG/M2 | TEMPERATURE: 97.6 F | DIASTOLIC BLOOD PRESSURE: 70 MMHG | SYSTOLIC BLOOD PRESSURE: 130 MMHG | HEART RATE: 107 BPM | OXYGEN SATURATION: 96 % | WEIGHT: 176.3 LBS | RESPIRATION RATE: 18 BRPM

## 2018-01-01 VITALS — TEMPERATURE: 98.6 F | WEIGHT: 177 LBS | BODY MASS INDEX: 26.83 KG/M2 | HEIGHT: 68 IN

## 2018-01-01 VITALS
SYSTOLIC BLOOD PRESSURE: 128 MMHG | RESPIRATION RATE: 16 BRPM | HEART RATE: 68 BPM | OXYGEN SATURATION: 90 % | WEIGHT: 173 LBS | BODY MASS INDEX: 26.22 KG/M2 | HEIGHT: 68 IN | DIASTOLIC BLOOD PRESSURE: 78 MMHG | TEMPERATURE: 97 F

## 2018-01-01 VITALS — TEMPERATURE: 97.8 F | WEIGHT: 155 LBS | BODY MASS INDEX: 23.49 KG/M2 | HEIGHT: 68 IN

## 2018-01-01 VITALS — BODY MASS INDEX: 27 KG/M2 | TEMPERATURE: 97.3 F | HEIGHT: 66 IN | WEIGHT: 168 LBS

## 2018-01-01 DIAGNOSIS — C61 PROSTATE CANCER (HCC): Primary | ICD-10-CM

## 2018-01-01 DIAGNOSIS — R33.9 RETENTION, URINE: ICD-10-CM

## 2018-01-01 DIAGNOSIS — C61 PROSTATIC CANCER (HCC): Primary | ICD-10-CM

## 2018-01-01 DIAGNOSIS — C61 PC (PROSTATE CANCER) (HCC): Primary | ICD-10-CM

## 2018-01-01 DIAGNOSIS — C61 PROSTATE CA (HCC): Primary | ICD-10-CM

## 2018-01-01 DIAGNOSIS — C61 PROSTATE CANCER (HCC): ICD-10-CM

## 2018-01-01 DIAGNOSIS — N30.21 CHRONIC CYSTITIS WITH HEMATURIA: ICD-10-CM

## 2018-01-01 LAB
ALBUMIN SERPL-MCNC: 4.3 G/DL (ref 3.5–5)
ALBUMIN SERPL-MCNC: 4.4 G/DL (ref 3.5–5)
ALBUMIN/GLOB SERPL: 1.1 G/DL (ref 1.1–2.5)
ALBUMIN/GLOB SERPL: 1.3 G/DL (ref 1.1–2.5)
ALP SERPL-CCNC: 168 U/L (ref 24–120)
ALP SERPL-CCNC: 271 U/L (ref 24–120)
ALT SERPL W P-5'-P-CCNC: 33 U/L (ref 0–54)
ALT SERPL W P-5'-P-CCNC: 37 U/L (ref 0–54)
ANION GAP SERPL CALCULATED.3IONS-SCNC: 10 MMOL/L (ref 4–13)
ANION GAP SERPL CALCULATED.3IONS-SCNC: 10 MMOL/L (ref 4–13)
AST SERPL-CCNC: 40 U/L (ref 7–45)
AST SERPL-CCNC: 42 U/L (ref 7–45)
BASOPHILS # BLD AUTO: 0.07 10*3/MM3 (ref 0–0.2)
BASOPHILS # BLD AUTO: 0.07 10*3/MM3 (ref 0–0.2)
BASOPHILS NFR BLD AUTO: 0.6 % (ref 0–2)
BASOPHILS NFR BLD AUTO: 0.9 % (ref 0–2)
BILIRUB SERPL-MCNC: 0.4 MG/DL (ref 0.1–1)
BILIRUB SERPL-MCNC: 0.8 MG/DL (ref 0.1–1)
BUN BLD-MCNC: 12 MG/DL (ref 5–21)
BUN BLD-MCNC: 8 MG/DL (ref 5–21)
BUN/CREAT SERPL: 13 (ref 7–25)
BUN/CREAT SERPL: 8.4 (ref 7–25)
CALCIUM SPEC-SCNC: 9.8 MG/DL (ref 8.4–10.4)
CALCIUM SPEC-SCNC: 9.8 MG/DL (ref 8.4–10.4)
CHLORIDE SERPL-SCNC: 100 MMOL/L (ref 98–110)
CHLORIDE SERPL-SCNC: 103 MMOL/L (ref 98–110)
CO2 SERPL-SCNC: 31 MMOL/L (ref 24–31)
CO2 SERPL-SCNC: 32 MMOL/L (ref 24–31)
CREAT BLD-MCNC: 0.92 MG/DL (ref 0.5–1.4)
CREAT BLD-MCNC: 0.95 MG/DL (ref 0.5–1.4)
DEPRECATED RDW RBC AUTO: 42.8 FL (ref 40–54)
DEPRECATED RDW RBC AUTO: 50.8 FL (ref 40–54)
EOSINOPHIL # BLD AUTO: 0.02 10*3/MM3 (ref 0–0.7)
EOSINOPHIL # BLD AUTO: 0.24 10*3/MM3 (ref 0–0.7)
EOSINOPHIL NFR BLD AUTO: 0.2 % (ref 0–4)
EOSINOPHIL NFR BLD AUTO: 3.1 % (ref 0–4)
ERYTHROCYTE [DISTWIDTH] IN BLOOD BY AUTOMATED COUNT: 13.2 % (ref 12–15)
ERYTHROCYTE [DISTWIDTH] IN BLOOD BY AUTOMATED COUNT: 15.3 % (ref 12–15)
GFR SERPL CREATININE-BSD FRML MDRD: 78 ML/MIN/1.73
GFR SERPL CREATININE-BSD FRML MDRD: 81 ML/MIN/1.73
GLOBULIN UR ELPH-MCNC: 3.4 GM/DL
GLOBULIN UR ELPH-MCNC: 3.9 GM/DL
GLUCOSE BLD-MCNC: 107 MG/DL (ref 70–100)
GLUCOSE BLD-MCNC: 93 MG/DL (ref 70–100)
HCT VFR BLD AUTO: 40.4 % (ref 40–52)
HCT VFR BLD AUTO: 41.4 % (ref 40–52)
HGB BLD-MCNC: 13.4 G/DL (ref 14–18)
HGB BLD-MCNC: 14.2 G/DL (ref 14–18)
HOLD SPECIMEN: NORMAL
IMM GRANULOCYTES # BLD: 0.03 10*3/MM3 (ref 0–0.03)
IMM GRANULOCYTES # BLD: 0.08 10*3/MM3 (ref 0–0.03)
IMM GRANULOCYTES NFR BLD: 0.4 % (ref 0–5)
IMM GRANULOCYTES NFR BLD: 0.7 % (ref 0–5)
LYMPHOCYTES # BLD AUTO: 1.57 10*3/MM3 (ref 0.72–4.86)
LYMPHOCYTES # BLD AUTO: 2.73 10*3/MM3 (ref 0.72–4.86)
LYMPHOCYTES NFR BLD AUTO: 12.8 % (ref 15–45)
LYMPHOCYTES NFR BLD AUTO: 35.3 % (ref 15–45)
MCH RBC QN AUTO: 29.3 PG (ref 28–32)
MCH RBC QN AUTO: 31.1 PG (ref 28–32)
MCHC RBC AUTO-ENTMCNC: 33.2 G/DL (ref 33–36)
MCHC RBC AUTO-ENTMCNC: 34.3 G/DL (ref 33–36)
MCV RBC AUTO: 88.4 FL (ref 82–95)
MCV RBC AUTO: 90.8 FL (ref 82–95)
MONOCYTES # BLD AUTO: 0.56 10*3/MM3 (ref 0.19–1.3)
MONOCYTES # BLD AUTO: 0.65 10*3/MM3 (ref 0.19–1.3)
MONOCYTES NFR BLD AUTO: 5.3 % (ref 4–12)
MONOCYTES NFR BLD AUTO: 7.2 % (ref 4–12)
NEUTROPHILS # BLD AUTO: 4.1 10*3/MM3 (ref 1.87–8.4)
NEUTROPHILS # BLD AUTO: 9.9 10*3/MM3 (ref 1.87–8.4)
NEUTROPHILS NFR BLD AUTO: 53.1 % (ref 39–78)
NEUTROPHILS NFR BLD AUTO: 80.4 % (ref 39–78)
NRBC BLD MANUAL-RTO: 0 /100 WBC (ref 0–0)
NRBC BLD MANUAL-RTO: 0 /100 WBC (ref 0–0)
PLATELET # BLD AUTO: 264 10*3/MM3 (ref 130–400)
PLATELET # BLD AUTO: 277 10*3/MM3 (ref 130–400)
PMV BLD AUTO: 9.4 FL (ref 6–12)
PMV BLD AUTO: 9.7 FL (ref 6–12)
POTASSIUM BLD-SCNC: 4.2 MMOL/L (ref 3.5–5.3)
POTASSIUM BLD-SCNC: 4.3 MMOL/L (ref 3.5–5.3)
PROT SERPL-MCNC: 7.8 G/DL (ref 6.3–8.7)
PROT SERPL-MCNC: 8.2 G/DL (ref 6.3–8.7)
PSA SERPL-MCNC: 47.5 NG/ML (ref 0–4)
RBC # BLD AUTO: 4.56 10*6/MM3 (ref 4.8–5.9)
RBC # BLD AUTO: 4.57 10*6/MM3 (ref 4.8–5.9)
SODIUM BLD-SCNC: 142 MMOL/L (ref 135–145)
SODIUM BLD-SCNC: 144 MMOL/L (ref 135–145)
WBC NRBC COR # BLD: 12.29 10*3/MM3 (ref 4.8–10.8)
WBC NRBC COR # BLD: 7.73 10*3/MM3 (ref 4.8–10.8)

## 2018-01-01 PROCEDURE — 99214 OFFICE O/P EST MOD 30 MIN: CPT | Performed by: UROLOGY

## 2018-01-01 PROCEDURE — 85025 COMPLETE CBC W/AUTO DIFF WBC: CPT

## 2018-01-01 PROCEDURE — 36415 COLL VENOUS BLD VENIPUNCTURE: CPT

## 2018-01-01 PROCEDURE — 99214 OFFICE O/P EST MOD 30 MIN: CPT | Performed by: INTERNAL MEDICINE

## 2018-01-01 PROCEDURE — 96402 CHEMO HORMON ANTINEOPL SQ/IM: CPT | Performed by: UROLOGY

## 2018-01-01 PROCEDURE — 99205 OFFICE O/P NEW HI 60 MIN: CPT | Performed by: INTERNAL MEDICINE

## 2018-01-01 PROCEDURE — 80053 COMPREHEN METABOLIC PANEL: CPT

## 2018-01-01 PROCEDURE — 80053 COMPREHEN METABOLIC PANEL: CPT | Performed by: INTERNAL MEDICINE

## 2018-01-01 PROCEDURE — 85025 COMPLETE CBC W/AUTO DIFF WBC: CPT | Performed by: INTERNAL MEDICINE

## 2018-01-01 PROCEDURE — 84153 ASSAY OF PSA TOTAL: CPT | Performed by: INTERNAL MEDICINE

## 2018-01-01 RX ORDER — ABIRATERONE ACETATE 250 MG/1
1000 TABLET ORAL DAILY
Qty: 120 TABLET | Refills: 11 | Status: SHIPPED | OUTPATIENT
Start: 2018-01-01

## 2018-01-01 RX ORDER — WARFARIN SODIUM 3 MG/1
3 TABLET ORAL
COMMUNITY

## 2018-01-01 RX ORDER — ASCORBIC ACID 500 MG
500 TABLET ORAL DAILY
COMMUNITY

## 2018-01-01 RX ORDER — PREDNISONE 1 MG/1
5 TABLET ORAL 2 TIMES DAILY
Qty: 60 TABLET | Refills: 11 | Status: SHIPPED | OUTPATIENT
Start: 2018-01-01

## 2018-01-01 RX ORDER — ACETAMINOPHEN 325 MG/1
650 TABLET ORAL EVERY 6 HOURS PRN
COMMUNITY

## 2018-01-01 RX ORDER — CALCIUM CARBONATE 500(1250)
TABLET ORAL 2 TIMES DAILY
COMMUNITY

## 2018-01-01 RX ORDER — AMOXICILLIN 250 MG
1 CAPSULE ORAL DAILY
COMMUNITY

## 2018-01-01 RX ORDER — PREDNISONE 1 MG/1
5 TABLET ORAL
Qty: 60 TABLET | Refills: 11 | Status: SHIPPED | OUTPATIENT
Start: 2018-01-01 | End: 2018-01-01 | Stop reason: SDUPTHER

## 2018-01-01 RX ORDER — MULTIPLE VITAMINS W/ MINERALS TAB 9MG-400MCG
1 TAB ORAL DAILY
COMMUNITY

## 2018-01-01 RX ORDER — WARFARIN SODIUM 2 MG/1
2 TABLET ORAL
COMMUNITY

## 2018-01-01 RX ORDER — SPIRONOLACTONE 25 MG/1
25 TABLET ORAL DAILY
COMMUNITY

## 2018-04-09 NOTE — PROGRESS NOTES
Patient of Dr. Schmitz states he is here for his 6 month Lupron injection. Patient denies any fever, chills, N&V or hematuria. I administered 45 mg/6 month Lupron injection IM right hip with no complications. Dr. Schmitz was in the office today at the time of injection. The patient was advised to follow up in 6 months for his next Lupron injection. Patient verbalized understanding.

## 2018-05-29 NOTE — PROGRESS NOTES
Subjective    Mr. Cano is 68 y.o. male     Chief Complaint: Prostate Cancer    History of Present Illness    Prostate Cancer  Pt. presents with history of prostate cancer diagnosed in April 2013. Current disease state ishigh risk disease Pt. underwent watchful waiting Path showing T1c 8 adenocarcinoma of prostate with N/A surgical margins. Patient has not received XRT completed in April 2013 .He is on Lupron for management of prostate cancer, ADT started 1/17. Pt. having zero ppd incontinence. Associated voiding symptoms include No evidence of voiding symptoms . Pt. Is having erectile dysfunction thatNA to PDE5 inhibitors. There has been no bone pain, weight loss, abdominal pain, back pain, or gross hematuria. Previous PSA 11.33. Most recent PSA on 05/30/2018 16.33.      Urinary Retention  Patient complains of urinary retention. Onset of retention was 36 months ago and was gradual in onset. Patient currently does have a urinary catheter in place. none ml of urine were drained when catheter was placed. Prior to this event voiding symptoms consisted of none. Prior treatments include SPT in place. Recent medications that may have affected his voiding include none.    The following portions of the patient's history were reviewed and updated as appropriate: allergies, current medications, past family history, past medical history, past social history, past surgical history and problem list.    Review of Systems   Constitutional: Negative for chills and fever.   Gastrointestinal: Negative for abdominal pain, anal bleeding and blood in stool.   Genitourinary: Negative for flank pain, frequency, hematuria and urgency.         Current Outpatient Prescriptions:   •  amantadine (SYMMETREL) 100 MG capsule, Take 100 mg by mouth Daily., Disp: , Rfl:   •  levothyroxine (SYNTHROID, LEVOTHROID) 112 MCG tablet, Take 112 mcg by mouth Daily., Disp: , Rfl:   •  PARoxetine (PAXIL) 20 MG tablet, Take 20 mg by mouth Every Morning., Disp: ,  "Rfl:   •  risperiDONE (RisperDAL) 0.5 MG tablet, Take 0.5 mg by mouth Daily., Disp: , Rfl:   •  warfarin (COUMADIN) 2.5 MG tablet, 2 mg Daily. As directed, Disp: , Rfl:   •  amoxicillin-clavulanate (AUGMENTIN) 500-125 MG per tablet, Take 1 tablet by mouth 2 (Two) Times a Day., Disp: , Rfl:   •  Calcium Carb-Cholecalciferol (OS-CARLOTA CALCIUM + D3) 500-200 MG-UNIT tablet, Take 1 tablet/day by mouth Daily., Disp: 90 tablet, Rfl: 3    Past Medical History:   Diagnosis Date   • Anemia    • Blood clotting disorder    • Cancer     prostate   • Depression    • Mentally challenged    • Thyroid disease        No past surgical history on file.    Social History     Social History   • Marital status: Single     Social History Main Topics   • Smoking status: Never Smoker   • Smokeless tobacco: Never Used   • Alcohol use No   • Drug use: Unknown     Other Topics Concern   • Not on file       Family History   Problem Relation Age of Onset   • No Known Problems Father    • No Known Problems Mother        Objective    Temp 97.8 °F (36.6 °C)   Ht 172.7 cm (68\")   Wt 70.3 kg (155 lb)   BMI 23.57 kg/m²     Physical Exam   Constitutional: He is oriented to person, place, and time. He appears well-developed and well-nourished. No distress.   Pulmonary/Chest: Effort normal.   Abdominal: Soft. He exhibits no distension and no mass. There is no tenderness. There is no rebound and no guarding. No hernia.   Neurological: He is alert and oriented to person, place, and time.   Skin: Skin is warm and dry. He is not diaphoretic.   Psychiatric: He has a normal mood and affect.   Vitals reviewed.          Results for orders placed or performed during the hospital encounter of 03/29/16   CBC (No diff)   Result Value Ref Range    WBC 7.52 4.80 - 10.80 K/mcL    RBC 4.32 (L) 4.80 - 5.90 M/mcL    Hemoglobin 13.1 (L) 14.0 - 18.0 g/dL    Hematocrit 38.0 (L) 40.0 - 52.0 %    MCV 88.0 82.0 - 95.0 fL    MCH 30.3 28.0 - 32.0 pg    MCHC 34.5 33.0 - 36.0 gm/dL "    RDW-SD 44.8 40.0 - 54.0 fL    RDW-CV 13.8 12.0 - 15.0 %    RDW 13.8 12 - 15 %    Platelets 262 130 - 400 K/mcL   APTT   Result Value Ref Range    PTT 40.1 (H) 24.1 - 34.8 Seconds   Protime-INR   Result Value Ref Range    Protime 16.4 (H) 11.9 - 14.6 Seconds    INR 1.30 (H) 0.91 - 1.09   Basic metabolic panel   Result Value Ref Range    Sodium 143 135 - 145 mmol/L    Potassium 3.5 3.5 - 5.3 mmol/L    Chloride 96 (L) 98 - 110 mmol/L    CO2 36 (H) 24 - 31 mmol/L    Glucose 101 (H) 70 - 100 mg/dL    BUN 9 5 - 21 mg/dL    Creatinine 0.89 0.5 - 1.4 mg/dL    Calcium 9.2 8.4 - 10.4 mg/dL    Anion Gap 12 4 - 13 mmol/L    Est GFR by Clearance 85 ml/min/1.732   Urinalysis   Result Value Ref Range    Color Yellow     Appearance, UA Cloudy (A) Clear    pH, UA 5.5 5.0 - 8.0    Specific Gravity, UA 1.017 1.005 - 1.030    Glucose, UA Negative Negative mg/dL    Ketones, UA Negative Negative mg/dL    Bilirubin, UA Negative Negative    Blood, UA Small (A) Negative    Protein, UA Trace (A) Negative mg/dL    Nitrite, UA Negative Negative    Leukocytes, UA Large (A) Negative    Urobilinogen, UA 0.2 0.2,1.0 E.U./dL   Urinalysis, Microscopic only   Result Value Ref Range    WBC, UA Too numerous to count (A) None Seen /hpf    RBC, UA Too numerous to count (A) None Seen /hpf    Epithelial Cells, UA None Seen None Seen /hpf    Bacteria, UA 2+ (A) None Seen /hpf    Casts 3-6 /lpf   Type and screen   Result Value Ref Range    ABORH O Rh Negative     Antibody Screen Negative     HBB Previous History Records Reviewed      Assessment and Plan    Tucker was seen today for prostate cancer.    Diagnoses and all orders for this visit:    Prostate cancer  -     Ambulatory Referral to Oncology  -     PSA DIAGNOSTIC; Future    Retention, urine          I reviewed laboratories from [facility] indicating a PSA of 16.33.  Previously it had been 11.33.  His testosterone is at castrate levels.  Obviously this is concerning for worsening of his  disease. His last Lupron injection appears to be in April 2018.     I discussed options including referral to oncology.  I discussed the new prostate cancer drug Erleada which would be indicated situation.  On  unfortunately I cannot get a CAT scan or a bone scan on him because he cannot stay still for the studies.    They would like a referral to Oncology to discuss treatment options for [facility].

## 2018-06-07 NOTE — PROGRESS NOTES
[facility]  HEMATOLOGY & ONCOLOGY        Subjective     VISIT DIAGNOSIS: No diagnosis found.    REASON FOR VISIT:     Chief Complaint   Patient presents with   • Prostate Cancer        HEMATOLOGY / ONCOLOGY HISTORY:    No history exists.     [No treatment plan]  Cancer Staging Information:  Cancer Staging  No matching staging information was found for the patient.      INTERVAL HISTORY  Patient ID: Tukcer Cano is a 68 y.o. year old male Cancer Staging  Pt with severe dementia, resides in assited living. diagnosed with prostate ca 4/2013. Watchful waiting initially. T1c nathaly 8 adenocarcinoma. xrt completed 4/2013. He was on ADT started 1/17. Has chemical progression. PSA 5/30/18 went from 11.33 to 16.33. Due to his severe parkinsonian tremor imaging not an option. He has no bone pain, respiratory issues or any complain whatsoever.        Review of Systems   Constitutional: Negative.    HENT: Negative.    Eyes: Negative.    Respiratory: Negative.    Cardiovascular: Negative.    Gastrointestinal: Negative.    Endocrine: Negative.    Genitourinary: Negative.    Musculoskeletal: Negative.    Skin: Negative.    Neurological: Positive for tremors.   Hematological: Negative.    Psychiatric/Behavioral: Negative.             Medications:    Current Outpatient Prescriptions   Medication Sig Dispense Refill   • acetaminophen (TYLENOL) 325 MG tablet Take 650 mg by mouth Every 6 (Six) Hours As Needed for Mild Pain .     • amantadine (SYMMETREL) 100 MG capsule Take 100 mg by mouth Daily.     • calcium carbonate, oyster shell, 500 MG tablet tablet Take  by mouth 2 (Two) Times a Day.     • levothyroxine (SYNTHROID, LEVOTHROID) 112 MCG tablet Take 112 mcg by mouth Daily.     • PARoxetine (PAXIL) 20 MG tablet Take 20 mg by mouth Every Morning.     • risperiDONE (RisperDAL) 0.5 MG tablet Take 0.5 mg by mouth Daily.     • senna-docusate (PERICOLACE) 8.6-50 MG per tablet Take 1 tablet by mouth Daily.     •  spironolactone (ALDACTONE) 25 MG tablet Take 25 mg by mouth Daily.     • vitamin C (ASCORBIC ACID) 500 MG tablet Take 500 mg by mouth Daily.     • warfarin (COUMADIN) 2 MG tablet Take 2 mg by mouth Daily.     • warfarin (COUMADIN) 3 MG tablet Take 3 mg by mouth Daily. Every other day alternating with 2mg     • enzalutamide (XTANDI) 40 MG chemo capsule Take 4 capsules by mouth Daily. 120 capsule 6     No current facility-administered medications for this visit.        ALLERGIES:    Allergies   Allergen Reactions   • Asa [Aspirin]    • Cephalosporins        Objective      Vitals:    06/07/18 1119   BP: 128/78   Pulse: 68   Resp: 16   Temp: 97 °F (36.1 °C)   SpO2: 90%       Current Status 6/7/2018   ECOG score 1       General Appearance: Passive tremor Patient is awake, alert and in no acute distress. Patient is welldeveloped, wellnourished, and appears stated age.  HEENT: Normocephalic. Sclerae clear, conjunctiva pink, extraocular movements intact, pupils, round, reactive to light and  accommodation. Mouth and throat are clear with moist oral mucosa.  NECK: Supple, no jugular venous distention, thyroid not enlarged.  LYMPH: No cervical, supraclavicular, axillary, or inguinal lymphadenopathy.  CHEST: Equal bilateral expansion, AP  diameter normal, resonant percussion note  LUNGS: Good air movement, no rales, rhonchi, rubs or wheezes with auscultation  CARDIO: Regular sinus rhythm, no murmurs, gallops or rubs.  ABDOMEN: Nondistended, soft, No tenderness, no guarding, no rebound, No hepatosplenomegaly. No abdominal masses. Bowel sounds positive. No hernia  GENITALIA: Not examined.  BREASTS: Not examined.  MUSKEL: No joint swelling, decreased motion, or inflammation  EXTREMS: No edema, clubbing, cyanosis, No varicose veins.  NEURO: Grossly nonfocal, Gait is coordinated and smooth, Cognition is preserved.  SKIN: No rashes, no ecchymoses, no petechia.  PSYCH: Not oriented to time, and  place . Memory is not  preserved. Mood  and affect appear flat.      RECENT LABS:  Lab on 06/07/2018   Component Date Value Ref Range Status   • Glucose 06/07/2018 93  70 - 100 mg/dL Final   • BUN 06/07/2018 8  5 - 21 mg/dL Final   • Creatinine 06/07/2018 0.95  0.50 - 1.40 mg/dL Final   • Sodium 06/07/2018 144  135 - 145 mmol/L Final   • Potassium 06/07/2018 4.3  3.5 - 5.3 mmol/L Final   • Chloride 06/07/2018 103  98 - 110 mmol/L Final   • CO2 06/07/2018 31.0  24.0 - 31.0 mmol/L Final   • Calcium 06/07/2018 9.8  8.4 - 10.4 mg/dL Final   • Total Protein 06/07/2018 8.2  6.3 - 8.7 g/dL Final   • Albumin 06/07/2018 4.30  3.50 - 5.00 g/dL Final   • ALT (SGPT) 06/07/2018 37  0 - 54 U/L Final   • AST (SGOT) 06/07/2018 40  7 - 45 U/L Final   • Alkaline Phosphatase 06/07/2018 168* 24 - 120 U/L Final   • Total Bilirubin 06/07/2018 0.4  0.1 - 1.0 mg/dL Final   • eGFR Non African Amer 06/07/2018 78  >60 mL/min/1.73 Final   • Globulin 06/07/2018 3.9  gm/dL Final   • A/G Ratio 06/07/2018 1.1  1.1 - 2.5 g/dL Final   • BUN/Creatinine Ratio 06/07/2018 8.4  7.0 - 25.0 Final   • Anion Gap 06/07/2018 10.0  4.0 - 13.0 mmol/L Final   • WBC 06/07/2018 7.73  4.80 - 10.80 10*3/mm3 Final   • RBC 06/07/2018 4.57* 4.80 - 5.90 10*6/mm3 Final   • Hemoglobin 06/07/2018 13.4* 14.0 - 18.0 g/dL Final   • Hematocrit 06/07/2018 40.4  40.0 - 52.0 % Final   • MCV 06/07/2018 88.4  82.0 - 95.0 fL Final   • MCH 06/07/2018 29.3  28.0 - 32.0 pg Final   • MCHC 06/07/2018 33.2  33.0 - 36.0 g/dL Final   • RDW 06/07/2018 13.2  12.0 - 15.0 % Final   • RDW-SD 06/07/2018 42.8  40.0 - 54.0 fl Final   • MPV 06/07/2018 9.4  6.0 - 12.0 fL Final   • Platelets 06/07/2018 277  130 - 400 10*3/mm3 Final   • Neutrophil % 06/07/2018 53.1  39.0 - 78.0 % Final   • Lymphocyte % 06/07/2018 35.3  15.0 - 45.0 % Final   • Monocyte % 06/07/2018 7.2  4.0 - 12.0 % Final   • Eosinophil % 06/07/2018 3.1  0.0 - 4.0 % Final   • Basophil % 06/07/2018 0.9  0.0 - 2.0 % Final   • Immature Grans % 06/07/2018 0.4  0.0 - 5.0 % Final    • Neutrophils, Absolute 06/07/2018 4.10  1.87 - 8.40 10*3/mm3 Final   • Lymphocytes, Absolute 06/07/2018 2.73  0.72 - 4.86 10*3/mm3 Final   • Monocytes, Absolute 06/07/2018 0.56  0.19 - 1.30 10*3/mm3 Final   • Eosinophils, Absolute 06/07/2018 0.24  0.00 - 0.70 10*3/mm3 Final   • Basophils, Absolute 06/07/2018 0.07  0.00 - 0.20 10*3/mm3 Final   • Immature Grans, Absolute 06/07/2018 0.03  0.00 - 0.03 10*3/mm3 Final   • nRBC 06/07/2018 0.0  0.0 - 0.0 /100 WBC Final   • Extra Tube 06/07/2018 Hold for add-ons.   Final    Auto resulted.   • Extra Tube 06/07/2018 Hold for add-ons.   Final    Auto resulted.       RADIOLOGY:  No results found.         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 CR prostate ca:not able to do imaging due to his tremors. Pt severely demented and does not interact. Does not think he will be able to tolerate chemotherapy with docetaxel. Will try xtandi since it is not given with prednisone. Also [facility] recommended first line.       2. Psychosoc: he does not have family member. He is [place]. I discussed with the care giver from the nursing home. Pros and cons discussed and printout of xtandi given. I answered his questions to the best of my ability    3. Hypothyroidism: on synthroid    4. Depression: on paxil  5. Parkinsonism: on amantadine, risperidone  6. CHF with MR: on spirinolactone, warfarin       Time Spent: 60 minutes; greater than  50% of time was spent in patient counseling and care coordination.     Tata Yap MD    6/7/2018    9:56 AM

## 2018-06-14 NOTE — TELEPHONE ENCOUNTER
Xtandi coverage was denied because the patient has had to have an inadequate response or intolerance to zytiga.  Discussed with Dr. Yap and verbal order given for zytiga and prednisone to be started on [place].  Notified [facility] of medication change.

## 2018-09-25 NOTE — PROGRESS NOTES
Patient of Dr. Schmitz states he is here for his 6 month Lupron injection. Patient denies any fever, chills, N&V or hematuria. I administered 45 mg/6 month Lupron injection IM L hip with no complications. Dr. Johnson was in the office today at the time of injection. The patient was advised to follow up in 6 months for his next Lupron injection. Patient verbalized understanding.     Reviewed and agreed with above

## 2018-10-05 NOTE — PROGRESS NOTES
Subjective    Mr. Cano is 68 y.o. male    Chief Complaint:  Prostate Cancer    History of Present Illness     Prostate Cancer  Pt. presents with history of prostate cancer diagnosed in April 2013. Current disease state ishigh risk disease Pt. underwent watchful waiting Path showing T1c 8 adenocarcinoma of prostate with N/A surgical margins. Patient has not received XRT completed in April 2013 .He is on Lupron for management of prostate cancer, ADT started 1/17. Pt. having zero ppd incontinence. Associated voiding symptoms include No evidence of voiding symptoms . Pt. Is having erectile dysfunction thatNA to PDE5 inhibitors. There has been no bone pain, weight loss, abdominal pain, back pain, or gross hematuria. Previous PSA 11.33. Most recent PSA on 51.85.      Urinary Retention  Patient complains of urinary retention. Onset of retention was 40 months ago and was gradual in onset. Patient currently does have a urinary catheter in place. none ml of urine were drained when catheter was placed. Prior to this event voiding symptoms consisted of none. Prior treatments include SPT in place. Recent medications that may have affected his voiding include none.      The following portions of the patient's history were reviewed and updated as appropriate: allergies, current medications, past family history, past medical history, past social history, past surgical history and problem list.    Review of Systems   Constitutional: Negative for chills and fever.   Gastrointestinal: Negative for abdominal pain, anal bleeding and blood in stool.   Genitourinary: Positive for hematuria. Negative for decreased urine volume, difficulty urinating, discharge, dysuria, enuresis, flank pain, frequency, genital sores, penile pain, penile swelling, scrotal swelling, testicular pain and urgency.         Current Outpatient Prescriptions:   •  abiraterone (ZYTIGA) 250 MG chemo tablet, Take 4 tablets by mouth Daily. On an empty stomach., Disp: 120  "tablet, Rfl: 11  •  acetaminophen (TYLENOL) 325 MG tablet, Take 650 mg by mouth Every 6 (Six) Hours As Needed for Mild Pain ., Disp: , Rfl:   •  amantadine (SYMMETREL) 100 MG capsule, Take 100 mg by mouth Daily., Disp: , Rfl:   •  calcium carbonate, oyster shell, 500 MG tablet tablet, Take  by mouth 2 (Two) Times a Day., Disp: , Rfl:   •  levothyroxine (SYNTHROID, LEVOTHROID) 112 MCG tablet, Take 112 mcg by mouth Daily., Disp: , Rfl:   •  PARoxetine (PAXIL) 20 MG tablet, Take 20 mg by mouth Every Morning., Disp: , Rfl:   •  predniSONE (DELTASONE) 5 MG tablet, Take 1 tablet by mouth 2 (Two) Times a Day., Disp: 60 tablet, Rfl: 11  •  risperiDONE (RisperDAL) 0.5 MG tablet, Take 0.5 mg by mouth Daily., Disp: , Rfl:   •  senna-docusate (PERICOLACE) 8.6-50 MG per tablet, Take 1 tablet by mouth Daily., Disp: , Rfl:   •  spironolactone (ALDACTONE) 25 MG tablet, Take 25 mg by mouth Daily., Disp: , Rfl:   •  vitamin C (ASCORBIC ACID) 500 MG tablet, Take 500 mg by mouth Daily., Disp: , Rfl:   •  warfarin (COUMADIN) 2 MG tablet, Take 2 mg by mouth Daily., Disp: , Rfl:   •  warfarin (COUMADIN) 3 MG tablet, Take 3 mg by mouth Daily. Every other day alternating with 2mg, Disp: , Rfl:     Past Medical History:   Diagnosis Date   • Anemia    • Blood clotting disorder (CMS/HCC)    • Cancer (CMS/HCC)     prostate   • Depression    • Mentally challenged    • Thyroid disease        No past surgical history on file.    Social History     Social History   • Marital status: Single     Social History Main Topics   • Smoking status: Never Smoker   • Smokeless tobacco: Never Used   • Alcohol use No   • Drug use: Unknown     Other Topics Concern   • Not on file       Family History   Problem Relation Age of Onset   • No Known Problems Father    • No Known Problems Mother        Objective    Temp 98.6 °F (37 °C)   Ht 172.7 cm (68\")   Wt 80.3 kg (177 lb)   BMI 26.91 kg/m²     Physical Exam   Constitutional: He is oriented to person, place, " and time. He appears well-developed and well-nourished. No distress.   Pulmonary/Chest: Effort normal.   Abdominal: Soft. He exhibits no distension and no mass. There is no tenderness. There is no rebound and no guarding. No hernia.   Genitourinary:   Genitourinary Comments: Spt in place with clear urine   Neurological: He is alert and oriented to person, place, and time.   Skin: Skin is warm and dry. He is not diaphoretic.   Psychiatric: He has a normal mood and affect.   Vitals reviewed.          Results for orders placed or performed in visit on 06/07/18   Comprehensive Metabolic Panel   Result Value Ref Range    Glucose 93 70 - 100 mg/dL    BUN 8 5 - 21 mg/dL    Creatinine 0.95 0.50 - 1.40 mg/dL    Sodium 144 135 - 145 mmol/L    Potassium 4.3 3.5 - 5.3 mmol/L    Chloride 103 98 - 110 mmol/L    CO2 31.0 24.0 - 31.0 mmol/L    Calcium 9.8 8.4 - 10.4 mg/dL    Total Protein 8.2 6.3 - 8.7 g/dL    Albumin 4.30 3.50 - 5.00 g/dL    ALT (SGPT) 37 0 - 54 U/L    AST (SGOT) 40 7 - 45 U/L    Alkaline Phosphatase 168 (H) 24 - 120 U/L    Total Bilirubin 0.4 0.1 - 1.0 mg/dL    eGFR Non African Amer 78 >60 mL/min/1.73    Globulin 3.9 gm/dL    A/G Ratio 1.1 1.1 - 2.5 g/dL    BUN/Creatinine Ratio 8.4 7.0 - 25.0    Anion Gap 10.0 4.0 - 13.0 mmol/L   CBC Auto Differential   Result Value Ref Range    WBC 7.73 4.80 - 10.80 10*3/mm3    RBC 4.57 (L) 4.80 - 5.90 10*6/mm3    Hemoglobin 13.4 (L) 14.0 - 18.0 g/dL    Hematocrit 40.4 40.0 - 52.0 %    MCV 88.4 82.0 - 95.0 fL    MCH 29.3 28.0 - 32.0 pg    MCHC 33.2 33.0 - 36.0 g/dL    RDW 13.2 12.0 - 15.0 %    RDW-SD 42.8 40.0 - 54.0 fl    MPV 9.4 6.0 - 12.0 fL    Platelets 277 130 - 400 10*3/mm3    Neutrophil % 53.1 39.0 - 78.0 %    Lymphocyte % 35.3 15.0 - 45.0 %    Monocyte % 7.2 4.0 - 12.0 %    Eosinophil % 3.1 0.0 - 4.0 %    Basophil % 0.9 0.0 - 2.0 %    Immature Grans % 0.4 0.0 - 5.0 %    Neutrophils, Absolute 4.10 1.87 - 8.40 10*3/mm3    Lymphocytes, Absolute 2.73 0.72 - 4.86 10*3/mm3     Monocytes, Absolute 0.56 0.19 - 1.30 10*3/mm3    Eosinophils, Absolute 0.24 0.00 - 0.70 10*3/mm3    Basophils, Absolute 0.07 0.00 - 0.20 10*3/mm3    Immature Grans, Absolute 0.03 0.00 - 0.03 10*3/mm3    nRBC 0.0 0.0 - 0.0 /100 WBC   Gold Top - SST   Result Value Ref Range    Extra Tube Hold for add-ons.    Gold Top - SST   Result Value Ref Range    Extra Tube Hold for add-ons.      Assessment and Plan    Diagnoses and all orders for this visit:    Prostate cancer (CMS/[facility])  -     Cancel: POC Urinalysis Dipstick, Multipro  -     Ambulatory Referral to Oncology    Retention, urine    Chronic cystitis with hematuria       I reviewed laboratories from [facility] indicating a PSA of 51.85, previously 16.33, 11.33.  His testosterone is at castrate levels.  Obviously this is concerning for worsening of his disease. His last Lupron injection appears to be in April 2018.     He was evaluated by Dr. ALICEA and was started on Xtandi in June 2018 however his medication list has Zytiga. I am going to contact her office to have the patient follow up with her.     It is likely that he will continue to have intermittent hematuria and have asked them to push fluids when this happens.  He will be indicated to go the emergency room he was passing large amount of clots or his catheter did not drain.

## 2018-10-11 NOTE — PROGRESS NOTES
[facility]  HEMATOLOGY & ONCOLOGY    Cancer Staging Information:  Cancer Staging  No matching staging information was found for the patient.      Subjective     VISIT DIAGNOSIS:   Encounter Diagnosis   Name Primary?   • Prostate CA (CMS/HCC) Yes       REASON FOR VISIT:     Chief Complaint   Patient presents with   • Prostate Cancer   • Follow-up        HEMATOLOGY / ONCOLOGY HISTORY:    No history exists.           INTERVAL HISTORY  Patient ID: Tucker Cano is a 68 y.o. year old male with CR prostate ca with chemical recurrence here for f/u on arbiraterone+prednisone.tolerates medication well. Caregiver noticed intermittent blood clot on his muhammad. They were in the er 2 days ago. Saw Dr. Bahena a day ago. cyctoscpy not done. However urine clear. No fever, chills or abdominal pain.    Past Medical History:   Past Medical History:   Diagnosis Date   • Anemia    • Blood clotting disorder (CMS/HCC)    • Cancer (CMS/HCC)     prostate   • Depression    • Mentally challenged    • Thyroid disease      Past Surgical History: No past surgical history on file.  Social History:   Social History     Social History   • Marital status: Single     Spouse name: N/A   • Number of children: N/A   • Years of education: N/A     Occupational History   • Not on file.     Social History Main Topics   • Smoking status: Never Smoker   • Smokeless tobacco: Never Used   • Alcohol use No   • Drug use: Unknown   • Sexual activity: Not on file     Other Topics Concern   • Not on file     Social History Narrative   • No narrative on file     Family History:   Family History   Problem Relation Age of Onset   • No Known Problems Father    • No Known Problems Mother        Review of Systems   Constitutional: Negative.    HENT: Negative.    Eyes: Negative.    Respiratory: Negative.    Cardiovascular: Negative.    Gastrointestinal: Negative.    Endocrine: Negative.    Genitourinary: Negative.    Musculoskeletal: Negative.    Skin:  "Negative.    Neurological: Positive for tremors.   Hematological: Negative.    Psychiatric/Behavioral: Negative.         Performance Status:  Asymptomatic    Medications:    Current Outpatient Prescriptions   Medication Sig Dispense Refill   • abiraterone (ZYTIGA) 250 MG chemo tablet Take 4 tablets by mouth Daily. On an empty stomach. 120 tablet 11   • acetaminophen (TYLENOL) 325 MG tablet Take 650 mg by mouth Every 6 (Six) Hours As Needed for Mild Pain .     • amantadine (SYMMETREL) 100 MG capsule Take 100 mg by mouth Daily.     • calcium carbonate, oyster shell, 500 MG tablet tablet Take  by mouth 2 (Two) Times a Day.     • levothyroxine (SYNTHROID, LEVOTHROID) 112 MCG tablet Take 112 mcg by mouth Daily.     • Multiple Vitamins-Minerals (MULTIVITAMIN WITH MINERALS) tablet tablet Take 1 tablet by mouth Daily.     • PARoxetine (PAXIL) 20 MG tablet Take 20 mg by mouth Every Morning.     • predniSONE (DELTASONE) 5 MG tablet Take 1 tablet by mouth 2 (Two) Times a Day. 60 tablet 11   • risperiDONE (RisperDAL) 0.5 MG tablet Take 0.5 mg by mouth Daily.     • senna-docusate (PERICOLACE) 8.6-50 MG per tablet Take 1 tablet by mouth Daily.     • spironolactone (ALDACTONE) 25 MG tablet Take 25 mg by mouth Daily.     • vitamin C (ASCORBIC ACID) 500 MG tablet Take 500 mg by mouth Daily.     • warfarin (COUMADIN) 2 MG tablet Take 2 mg by mouth Daily.     • warfarin (COUMADIN) 3 MG tablet Take 3 mg by mouth Daily. Every other day alternating with 2mg       No current facility-administered medications for this visit.        ALLERGIES:    Allergies   Allergen Reactions   • Asa [Aspirin]    • Cephalosporins        Objective      Vitals:    10/11/18 1314   BP: 130/70   Pulse: 107   Resp: 18   Temp: 97.6 °F (36.4 °C)   TempSrc: Tympanic   SpO2: 96%   Weight: 80 kg (176 lb 4.8 oz)   Height: 172.7 cm (68\")         Current Status 10/11/2018   ECOG score 3         Physical Exam  General Appearance: Patient is awake, alert,not oriented due " t his baseline dementia and in no acute distress. Patient is welldeveloped, wellnourished,  HEENT: Normocephalic. Sclerae clear, conjunctiva pink, extraocular movements intact, pupils, round, reactive to light and  accommodation. Mouth and throat are clear with moist oral mucosa.  NECK: Supple, no jugular venous distention, thyroid not enlarged.  LYMPH: No cervical, supraclavicular, axillary, or inguinal lymphadenopathy.  CHEST: Equal bilateral expansion, AP  diameter normal, resonant percussion note  LUNGS: Good air movement, no rales, rhonchi, rubs or wheezes with auscultation  CARDIO: Regular sinus rhythm, no murmurs, gallops or rubs.  ABDOMEN: Nondistended, soft, No tenderness, no guarding, no rebound, No hepatosplenomegaly. No abdominal masses. Bowel sounds positive. No hernia  GENITALIA: Not examined.  BREASTS: Not examined.  MUSKEL: No joint swelling, decreased motion, or inflammation  EXTREMS: No edema, clubbing, cyanosis, No varicose veins.  NEURO: Grossly nonfocal, Gait is coordinated and smooth, Cognition is preserved.  SKIN: No rashes, no ecchymoses, no petechia.  PSYCH: Oriented to time, place and person. Memory is preserved. Mood and affect appear normal  RECENT LABS:  Lab on 10/11/2018   Component Date Value Ref Range Status   • Glucose 10/11/2018 107* 70 - 100 mg/dL Final   • BUN 10/11/2018 12  5 - 21 mg/dL Final   • Creatinine 10/11/2018 0.92  0.50 - 1.40 mg/dL Final   • Sodium 10/11/2018 142  135 - 145 mmol/L Final   • Potassium 10/11/2018 4.2  3.5 - 5.3 mmol/L Final   • Chloride 10/11/2018 100  98 - 110 mmol/L Final   • CO2 10/11/2018 32.0* 24.0 - 31.0 mmol/L Final   • Calcium 10/11/2018 9.8  8.4 - 10.4 mg/dL Final   • Total Protein 10/11/2018 7.8  6.3 - 8.7 g/dL Final   • Albumin 10/11/2018 4.40  3.50 - 5.00 g/dL Final   • ALT (SGPT) 10/11/2018 33  0 - 54 U/L Final   • AST (SGOT) 10/11/2018 42  7 - 45 U/L Final   • Alkaline Phosphatase 10/11/2018 271* 24 - 120 U/L Final   • Total Bilirubin  10/11/2018 0.8  0.1 - 1.0 mg/dL Final   • eGFR Non African Amer 10/11/2018 81  >60 mL/min/1.73 Final   • Globulin 10/11/2018 3.4  gm/dL Final   • A/G Ratio 10/11/2018 1.3  1.1 - 2.5 g/dL Final   • BUN/Creatinine Ratio 10/11/2018 13.0  7.0 - 25.0 Final   • Anion Gap 10/11/2018 10.0  4.0 - 13.0 mmol/L Final   • WBC 10/11/2018 12.29* 4.80 - 10.80 10*3/mm3 Final   • RBC 10/11/2018 4.56* 4.80 - 5.90 10*6/mm3 Final   • Hemoglobin 10/11/2018 14.2  14.0 - 18.0 g/dL Final   • Hematocrit 10/11/2018 41.4  40.0 - 52.0 % Final   • MCV 10/11/2018 90.8  82.0 - 95.0 fL Final   • MCH 10/11/2018 31.1  28.0 - 32.0 pg Final   • MCHC 10/11/2018 34.3  33.0 - 36.0 g/dL Final   • RDW 10/11/2018 15.3* 12.0 - 15.0 % Final   • RDW-SD 10/11/2018 50.8  40.0 - 54.0 fl Final   • MPV 10/11/2018 9.7  6.0 - 12.0 fL Final   • Platelets 10/11/2018 264  130 - 400 10*3/mm3 Final   • Neutrophil % 10/11/2018 80.4* 39.0 - 78.0 % Final   • Lymphocyte % 10/11/2018 12.8* 15.0 - 45.0 % Final   • Monocyte % 10/11/2018 5.3  4.0 - 12.0 % Final   • Eosinophil % 10/11/2018 0.2  0.0 - 4.0 % Final   • Basophil % 10/11/2018 0.6  0.0 - 2.0 % Final   • Immature Grans % 10/11/2018 0.7  0.0 - 5.0 % Final   • Neutrophils, Absolute 10/11/2018 9.90* 1.87 - 8.40 10*3/mm3 Final   • Lymphocytes, Absolute 10/11/2018 1.57  0.72 - 4.86 10*3/mm3 Final   • Monocytes, Absolute 10/11/2018 0.65  0.19 - 1.30 10*3/mm3 Final   • Eosinophils, Absolute 10/11/2018 0.02  0.00 - 0.70 10*3/mm3 Final   • Basophils, Absolute 10/11/2018 0.07  0.00 - 0.20 10*3/mm3 Final   • Immature Grans, Absolute 10/11/2018 0.08* 0.00 - 0.03 10*3/mm3 Final   • nRBC 10/11/2018 0.0  0.0 - 0.0 /100 WBC Final       RADIOLOGY:  No results found.         Assessment/Plan  Tucker Cano is a 68 y.o. 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 CR  prostate ca:not able to do imaging due to his tremors. Pt severely demented and does not interact. Does not think he will be able to tolerate chemotherapy with docetaxel.   --his insurance preferred arbiraterone +presnidone. He has asif on it since July. His PSA at the urologist was low 40s.       2. Psychosoc: he does not have family member. [place] is [place]. I discussed with the care giver from the nursing home. Pros and cons discussed and printout of xtandi given. I answered his questions to the best of my ability    3. Hypothyroidism: on synthroid    4. Depression: on paxil  5. Parkinsonism: on amantadine, risperidone  6. CHF with MR: on spirinolactone, warfarin  7. Intermittent blood clot from muhammad: suspect from irritation from the muhammad and being ob blood thinner.      Tata Yap MD    10/11/2018    2:04 PM

## 2018-11-01 NOTE — TELEPHONE ENCOUNTER
Received call from Krystian stating that Tucker has taken a turn for the worse.  He ended up in the hospital on 10/24/18 was having no urine output.  Catheter had to be replaced which caused him to bleed quite a bit.  Did scans which showed his cancer had spread to the pelvis which is fractured.  Tucker became septic and he was placed on comfort measures.  Called to cancel his appointment for the 7 th and are not sure at this time if they will be rescheduling.

## 2018-11-07 ENCOUNTER — APPOINTMENT (OUTPATIENT)
Dept: LAB | Facility: HOSPITAL | Age: 68
End: 2018-11-07

## 2019-04-09 ENCOUNTER — RESULTS ENCOUNTER (OUTPATIENT)
Dept: UROLOGY | Facility: CLINIC | Age: 69
End: 2019-04-09

## 2019-04-09 DIAGNOSIS — C61 PROSTATE CANCER (HCC): ICD-10-CM

## 2023-05-18 RX ORDER — ETODOLAC 500 MG/1
500 TABLET, FILM COATED ORAL 2 TIMES DAILY
COMMUNITY